# Patient Record
Sex: FEMALE | URBAN - METROPOLITAN AREA
[De-identification: names, ages, dates, MRNs, and addresses within clinical notes are randomized per-mention and may not be internally consistent; named-entity substitution may affect disease eponyms.]

---

## 2023-06-09 ENCOUNTER — APPOINTMENT (OUTPATIENT)
Dept: RADIOLOGY | Facility: MEDICAL CENTER | Age: 58
End: 2023-06-09
Attending: EMERGENCY MEDICINE
Payer: COMMERCIAL

## 2023-06-09 ENCOUNTER — HOSPITAL ENCOUNTER (EMERGENCY)
Facility: MEDICAL CENTER | Age: 58
End: 2023-06-09
Attending: EMERGENCY MEDICINE

## 2023-06-09 VITALS
TEMPERATURE: 97.5 F | OXYGEN SATURATION: 93 % | WEIGHT: 163.36 LBS | SYSTOLIC BLOOD PRESSURE: 100 MMHG | BODY MASS INDEX: 27.89 KG/M2 | HEIGHT: 64 IN | DIASTOLIC BLOOD PRESSURE: 64 MMHG | RESPIRATION RATE: 18 BRPM | HEART RATE: 75 BPM

## 2023-06-09 DIAGNOSIS — Z79.82 ASPIRIN LONG-TERM USE: ICD-10-CM

## 2023-06-09 DIAGNOSIS — T14.8XXA HEMATOMA: ICD-10-CM

## 2023-06-09 PROCEDURE — 99283 EMERGENCY DEPT VISIT LOW MDM: CPT

## 2023-06-09 PROCEDURE — 93971 EXTREMITY STUDY: CPT | Mod: 26,LT | Performed by: INTERNAL MEDICINE

## 2023-06-09 PROCEDURE — 93971 EXTREMITY STUDY: CPT | Mod: LT

## 2023-06-09 NOTE — ED NOTES
Reviewed discharge instructions, patient verbalized understanding. States they will schedule follow up appointment if needed.   Int #[id number]    Denies further questions at this time. Pt ambulatory out of ER with steady gait where she was taken home by friend.

## 2023-06-09 NOTE — ED TRIAGE NOTES
"Chief Complaint   Patient presents with    Dog Bite     Reports that she was bit by a dog apx 16 days ago in her left medial thigh. Reports that she is concerned for an infection in the site. +ASA, -fevers @ home     Pt ambulatory to triage for above complaint. Reports that she has been seen x2 in the ER in [place]. Pt reports that she has finished 2 courses of Augmentin.     Pt A&Ox4, GCS15, NAD. Pt placed back in ER lobby and encouraged to alert staff of any changes    /79   Pulse (!) 103   Temp 36.2 °C (97.2 °F) (Temporal)   Resp 17   Ht 1.626 m (5' 4\")   Wt 74.1 kg (163 lb 5.8 oz)   SpO2 98%   BMI 28.04 kg/m²     "

## 2023-06-09 NOTE — ED NOTES
Pt ambulated to the room with steady gait and without assistance. Changed into a gown and placed on BP and SpO2 monitors. Call light within reach. No further complaints at this time. Chart up for ERP.

## 2023-06-09 NOTE — ED PROVIDER NOTES
"ED Provider Note    Primary care provider: No primary care provider on file.    CHIEF COMPLAINT  Chief Complaint   Patient presents with    Dog Bite     Reports that she was bit by a dog apx 16 days ago in her left medial thigh. Reports that she is concerned for an infection in the site. +ASA, -fevers @ home       Additional history obtained from: Family, in fact they bring pictures to show to the progression of the ecchymosis.  Limitation to History:  Select: Language Welsh,  Used     HPI  Nguyen Méndez is a 57 y.o. female who presents to the Emergency Department with a dog bite about 2.5 weeks ago to the left thigh.  The patient had significant ecchymosis, dark discoloration, this is steadily improved though she still has a painful lump in the center of the dog bite, she cannot put pressure on this location or where parents secondary to the pain.  She does take aspirin daily due to prior history of retinal artery thrombosis.    Denies any numbness or focal weakness.  Has been on 2 courses of antibiotics, seen twice at [facility] previously.        REVIEW OF SYSTEMS  See HPI.     PAST MEDICAL HISTORY       SURGICAL HISTORY  patient denies any surgical history    SOCIAL HISTORY  Social History     Tobacco Use    Smoking status: Never    Smokeless tobacco: Never   Substance Use Topics    Alcohol use: Never    Drug use: Never      Social History     Substance and Sexual Activity   Drug Use Never       FAMILY HISTORY  History reviewed. No pertinent family history.    CURRENT MEDICATIONS  Reviewed.  See Encounter Summary.     ALLERGIES  No Known Allergies    PHYSICAL EXAM  VITAL SIGNS: /64   Pulse 75   Temp 36.4 °C (97.5 °F) (Temporal)   Resp 18   Ht 1.626 m (5' 4\")   Wt 74.1 kg (163 lb 5.8 oz)   SpO2 93%   BMI 28.04 kg/m²   Constitutional: Awake, alert in no apparent distress.  HENT: Normocephalic, Bilateral external ears normal. Nose normal.   Eyes: Conjunctiva normal, " non-icteric, EOMI.    Thorax & Lungs: Easy unlabored respirations, Clear to ascultation bilaterally.  Cardiovascular: Regular rate, Regular rhythm, No murmurs, rubs or gallops. Bilateral pulses symmetrical.   Abdomen:  Soft, nontender, nondistended, normal active bowel sounds.   :    Skin: Visualized skin is  Dry, No erythema, No rash.   Musculoskeletal:   10 cm halo of resolving ecchymosis on the left distal medial thigh, there is a 2 x 3 cm firmness in the center of the dog bite/punctum consistent with a prior hematoma.  Neurologic: Alert, Grossly non-focal.   Psychiatric: Normal affect, Normal mood  Lymphatic:          RADIOLOGY  I have independently interpreted the diagnostic imaging associated with this visit and am waiting the final reading from the radiologist.   My preliminary interpretation is as follows: Compressible venous system    Radiologist interpretation:   US-EXTREMITY VENOUS LOWER UNILAT LEFT   Final Result          COURSE & MEDICAL DECISION MAKING  Pertinent Labs & Imaging studies reviewed. (See chart for details)    COURSE & MEDICAL DECISION MAKING  Pertinent Labs & Imaging studies reviewed. (See chart for details)    Differential diagnoses include but are not limited to: Hematoma.  Other consideration would be DVT, less likely.    2:43 PM - Nursing notes reviewed, patient seen and examined at bedside.    ED Observation Status? Yes; I am placing the patient in to an observation status due to a diagnostic uncertainty as well as therapeutic intensity. Patient placed in observation status at 3:05 PM:     Observation plan is as follows: Ultrasound, if positive for DVT would need laboratory evaluation and anticoagulation, if negative would anticipate discharge.    Upon Reevaluation, the patient's condition has: Remained stable    Patient discharged from ED Observation status at 3:45 PM    Escalation of care considered, and ultimately not performed: blood analysis.    Barriers to care at this time,  including but not limited to: Patient does not have established PCP.     Decision tools and prescription drugs considered including, but not limited to: Antibiotics not indicated    Decision Making:  This is a pleasant 57 y.o. year old female who presents with pain, swelling in the left thigh and location of a dog bite.  The patient has completed 2 courses of Augmentin, today I do not see any sign of infection.  The firm area is consistent with an old hematoma, explained that this will take weeks to eventually resolve.  She is on aspirin therapy which is the reason why she developed such a large area of swelling and ecchymosis to begin with.  Recommend she continue on her aspirin therapy.  I did obtain an ultrasound as location of some discomfort is in the direct path of the venous system.  Ultrasound negative for DVT.  Reassurance provided, the patient does not need any specific follow-up.    ADDITIONAL PROBLEM LIST  Prior history of retinal artery thrombosis, continue aspirin     The patient was discharged home (see d/c instructions) was told to return immediately for any signs or symptoms listed, or any worsening at all.  The patient verbally agreed to the discharge precautions and follow-up plan which is documented in EPIC.    Discharge Medications:  There are no discharge medications for this patient.      FINAL IMPRESSION  1. Hematoma    2. Aspirin long-term use

## 2023-07-26 ENCOUNTER — APPOINTMENT (OUTPATIENT)
Dept: RADIOLOGY | Facility: MEDICAL CENTER | Age: 58
End: 2023-07-26
Attending: PHYSICIAN ASSISTANT